# Patient Record
Sex: FEMALE | Race: WHITE | NOT HISPANIC OR LATINO
[De-identification: names, ages, dates, MRNs, and addresses within clinical notes are randomized per-mention and may not be internally consistent; named-entity substitution may affect disease eponyms.]

---

## 2021-05-10 ENCOUNTER — APPOINTMENT (OUTPATIENT)
Dept: BARIATRICS | Facility: CLINIC | Age: 26
End: 2021-05-10
Payer: COMMERCIAL

## 2021-05-10 ENCOUNTER — TRANSCRIPTION ENCOUNTER (OUTPATIENT)
Age: 26
End: 2021-05-10

## 2021-05-10 VITALS — WEIGHT: 240 LBS | BODY MASS INDEX: 39.51 KG/M2 | HEIGHT: 65.5 IN

## 2021-05-10 DIAGNOSIS — Z82.49 FAMILY HISTORY OF ISCHEMIC HEART DISEASE AND OTHER DISEASES OF THE CIRCULATORY SYSTEM: ICD-10-CM

## 2021-05-10 DIAGNOSIS — Z87.42 PERSONAL HISTORY OF OTHER DISEASES OF THE FEMALE GENITAL TRACT: ICD-10-CM

## 2021-05-10 DIAGNOSIS — R51.9 HEADACHE, UNSPECIFIED: ICD-10-CM

## 2021-05-10 DIAGNOSIS — F90.1 ATTENTION-DEFICIT HYPERACTIVITY DISORDER, PREDOMINANTLY HYPERACTIVE TYPE: ICD-10-CM

## 2021-05-10 DIAGNOSIS — Z78.9 OTHER SPECIFIED HEALTH STATUS: ICD-10-CM

## 2021-05-10 PROCEDURE — 99205 OFFICE O/P NEW HI 60 MIN: CPT | Mod: 95

## 2021-05-10 RX ORDER — METFORMIN HYDROCHLORIDE 500 MG/1
500 TABLET, COATED ORAL
Refills: 0 | Status: ACTIVE | COMMUNITY

## 2021-05-10 RX ORDER — DEXTROAMPHETAMINE SULFATE, DEXTROAMPHETAMINE SACCHARATE, AMPHETAMINE SULFATE AND AMPHETAMINE ASPARTATE 1.25; 1.25; 1.25; 1.25 MG/1; MG/1; MG/1; MG/1
5 CAPSULE, EXTENDED RELEASE ORAL
Refills: 0 | Status: ACTIVE | COMMUNITY

## 2021-05-10 NOTE — HISTORY OF PRESENT ILLNESS
[Home] : at home, [unfilled] , at the time of the visit. [Other Location: e.g. Home (Enter Location, City,State)___] : at [unfilled] [Mother] : mother [Verbal consent obtained from patient] : the patient, [unfilled] [Improved Health] : Improved health [Improved Looks/Aesthetics] : Improved looks/aesthetics [Childhood] : childhood [Exercise: ____] : exercise: [unfilled] [Commerial Program: _____] : commercial program: [unfilled] [Medical conditions] : medical conditions [Having a specific meal plan to follow] : having a specific meal plan to follow [FreeTextEntry2] : 210 [FreeTextEntry3] : 240 [______] : [unfilled] [I usually sleep 8 or more hours] : I usually sleep 8 or more hours [Salty] : salty [Sweet] : sweet [0] : How many cups of juice do you drink per day: 0 [8+] : How many cups of water do you regularly drink per day: 8+ [1] : Cups of coffee per day: 1 [Other: ____] : [unfilled] [Parent] : parent [Walking] : walking [3] : 3 [20] : 20 [] : No [Other: ___] : [unfilled] [Metformin] : metformin  [FreeTextEntry1] : 26 y/o female who was referred to medical weight loss program by Dr. Seay\par Hx of PCOS & pre-diabetes \par Follows a healthy dietary lifestyle and feels frustrated that she continues to gain weight and lab values are getting worse- reports increasing A1C and cholesterol levels \par Dietary intake- focuses on lean proteins, eating fruits, vegetables, low carbohydrate diet\par Exercises 3-4 times per week on [employer] & online exercise FIT program\par Takes Metformin 2,000 mg daily, initially saw weight reduction, but has regained weight\par Sleep & water intake adequate \par LMP- 2 weeks ago, currently on birth control

## 2021-05-10 NOTE — ASSESSMENT
[FreeTextEntry1] : Lifestyle modification discussed- educated on decreasing simple sugars and substituting with complex carbohydrates. Pt's current diet is low in carbs, high in protein and vegetables & fruits. Discussed limiting fruit intake and pairing with protein. \par RD referral- interested in specific meal plan, may try optavia \par Exercise goal of 4 days per week, pt currently exercises 3-4 times per week\par Lab- pt to have lab work faxed to our office\par Obesity Medication- pt meets criteria for weight loss medication at this time. She has tried diet & exercise for over 6 months without results. Discussed the option to treat obesity with a GLPI. Patient denies contraindications: denies family hx or personal history of medullary thyroid carcinoma or MEN 2, denies history of pancreatitis/gallbladder disease/hypoglycemia.  Discussed mechanism of action- regulating appetite/ reducing hunger. Reviewed side effects including: N/V/D/C, injection site reactions, headaches, low blood sugar, dizziness, abdominal pain, and fatigue. Reviewed ways to decrease nausea including: eating bland/lowfat foods, eating foods that contain water, and avoiding lying flat after eating. Pt would like to try Rybelsus since it is a pill form.\par RTO in 2 weeks- scheduled a follow up apt for 5/25 at 12pm- pt can only meet at her lunch hour\par Emailed pt information on medication \par \par

## 2021-05-25 ENCOUNTER — APPOINTMENT (OUTPATIENT)
Dept: BARIATRICS | Facility: CLINIC | Age: 26
End: 2021-05-25
Payer: COMMERCIAL

## 2021-05-25 VITALS — BODY MASS INDEX: 38.68 KG/M2 | HEIGHT: 65.5 IN | WEIGHT: 235 LBS

## 2021-05-25 PROCEDURE — 99213 OFFICE O/P EST LOW 20 MIN: CPT | Mod: 95

## 2021-05-25 NOTE — ASSESSMENT
[FreeTextEntry1] : Lifestyle- continue to focus on eating high protein high vegetables diet \par Exercise- continue current exercise regimen\par RD referral per pt request\par Medication- continue Rybelsus 3 mg daily, can increase to 2 pills daily in 2 weeks, then will order 7 mg dose going forward. \par RTO in 3 weeks for frequent check ins- scheduled an apt for 6/21 at 5:30 pm

## 2021-05-25 NOTE — HISTORY OF PRESENT ILLNESS
[Home] : at home, [unfilled] , at the time of the visit. [Other Location: e.g. Home (Enter Location, City,State)___] : at [unfilled] [Verbal consent obtained from patient] : the patient, [unfilled] [FreeTextEntry1] : 24 y/o female who was referred to medical weight loss program by Dr. Seay\par Hx of PCOS & pre-diabetes \par Follows a healthy dietary lifestyle and feels frustrated that she continues to gain weight and lab values are getting worse- reports increasing A1C and cholesterol levels \par Dietary intake- focuses on lean proteins, eating fruits, vegetables, low carbohydrate diet\par Exercises 3-4 times per week on [employer] & online exercise FIT program\par Takes Metformin 2,000 mg daily, initially saw weight reduction, but has regained weight\par Sleep & water intake adequate \par LMP- 2 weeks ago, currently on birth control \par \par 5/25/21: Reports feeling well, lost 5 lbs in 2 weeks. Has been focusing on eating healthy meals and exercising more. Taking Rybelsus 3 mg daily, denies side effects and reports decreased cravings. Used to have late night snacks and doesn't crave them anymore.

## 2021-06-21 ENCOUNTER — APPOINTMENT (OUTPATIENT)
Dept: BARIATRICS | Facility: CLINIC | Age: 26
End: 2021-06-21
Payer: COMMERCIAL

## 2021-06-21 VITALS — HEIGHT: 65.5 IN | WEIGHT: 227 LBS | BODY MASS INDEX: 37.37 KG/M2

## 2021-06-21 PROCEDURE — 99213 OFFICE O/P EST LOW 20 MIN: CPT | Mod: 95

## 2021-06-21 NOTE — HISTORY OF PRESENT ILLNESS
[Home] : at home, [unfilled] , at the time of the visit. [Other Location: e.g. Home (Enter Location, City,State)___] : at [unfilled] [Verbal consent obtained from patient] : the patient, [unfilled] [FreeTextEntry1] : 26 y/o female who was referred to medical weight loss program by Dr. Seay\par Hx of PCOS & pre-diabetes \par Follows a healthy dietary lifestyle and feels frustrated that she continues to gain weight and lab values are getting worse- reports increasing A1C and cholesterol levels \par Dietary intake- focuses on lean proteins, eating fruits, vegetables, low carbohydrate diet\par Exercises 3-4 times per week on [employer] & online exercise FIT program\par Takes Metformin 2,000 mg daily, initially saw weight reduction, but has regained weight\par Sleep & water intake adequate \par LMP- 2 weeks ago, currently on birth control \par \par 5/25/21: Reports feeling well, lost 5 lbs in 2 weeks. Has been focusing on eating healthy meals and exercising more. Taking Rybelsus 3 mg daily, denies side effects and reports decreased cravings. Used to have late night snacks and doesn't crave them anymore. \par \par 6/21/21: Lost 8 lbs,feeling well. Taking 7 mg of Rybelsus daily, occasionally feels nauseous in the morning, denies vomiting. Notices + appetite suppression. Continues to exercise 3-4 times per week. Food recall: B- nuts & yogurt, L-salad with tuna, D- fish with brocolli. Focuses on getting in lean protein and vegetables.

## 2021-06-21 NOTE — ASSESSMENT
[FreeTextEntry1] : Continue current dose of Rybelsus, can decrease dose if nausea persists. \par Continue healthy diet and exercise regimen\par RTO in 1 month- scheduled a follow up appt on 7/19 at 12:00

## 2021-07-19 ENCOUNTER — APPOINTMENT (OUTPATIENT)
Dept: BARIATRICS | Facility: CLINIC | Age: 26
End: 2021-07-19
Payer: COMMERCIAL

## 2021-07-19 VITALS — BODY MASS INDEX: 36.21 KG/M2 | WEIGHT: 220 LBS | HEIGHT: 65.5 IN

## 2021-07-19 PROCEDURE — 99214 OFFICE O/P EST MOD 30 MIN: CPT | Mod: 95

## 2021-07-19 NOTE — ASSESSMENT
[FreeTextEntry1] : Continue dietary modification- will send information on protein shakes (high in protein & low in sugar content)\par Recommend increasing exercise regimen to aerobic exercise for 30-45 mins  3 times per week \par Continue Rybelsus 7 mg daily- has appropriate appetite suppression \par RTO in 1 month- scheduled an apt for 8/23 at 12 pm

## 2021-07-19 NOTE — HISTORY OF PRESENT ILLNESS
[FreeTextEntry1] : 26 y/o female who was referred to medical weight loss program by Dr. Seay\par Hx of PCOS & pre-diabetes \par Follows a healthy dietary lifestyle and feels frustrated that she continues to gain weight and lab values are getting worse- reports increasing A1C and cholesterol levels \par Dietary intake- focuses on lean proteins, eating fruits, vegetables, low carbohydrate diet\par Exercises 3-4 times per week on [employer] & online exercise FIT program\par Takes Metformin 2,000 mg daily, initially saw weight reduction, but has regained weight\par Sleep & water intake adequate \par LMP- 2 weeks ago, currently on birth control \par \par 5/25/21: Reports feeling well, lost 5 lbs in 2 weeks. Has been focusing on eating healthy meals and exercising more. Taking Rybelsus 3 mg daily, denies side effects and reports decreased cravings. Used to have late night snacks and doesn't crave them anymore. \par \par 6/21/21: Lost 8 lbs,feeling well. Taking 7 mg of Rybelsus daily, occasionally feels nauseous in the morning, denies vomiting. Notices + appetite suppression. Continues to exercise 3-4 times per week. Food recall: B- nuts & yogurt, L-salad with tuna, D- fish with brocolli. Focuses on getting in lean protein and vegetables. \par \par 7/19/21: Down to 220 lbs, lost additional 7 lbs. Feels better in clothes and overall. Feels well on Rybelsus 7 mg daily. Reports good appetite suppression & feeling full. Water intake adequate, gets good sleep and denies stress. Has been walking for exercise 2-3 times per week. Food recall- B: 2 scrambled eggs with spinach and onions, L: tuna with crackers, D- all vegetable salad. C/o nausea two days while she had her menstrual cycle, denies nausea otherwise. \par

## 2021-07-19 NOTE — REASON FOR VISIT
[Home] : at home, [unfilled] , at the time of the visit. [Other Location: e.g. Home (Enter Location, City,State)___] : at [unfilled] [Verbal consent obtained from patient] : the patient, [unfilled] [Follow-Up Visit] : a follow-up visit for [Obesity] : obesity [Polycystic Ovary Syndrome] : polycystic ovary syndrome [FreeTextEntry2] : Pre-diabetes

## 2021-08-23 ENCOUNTER — APPOINTMENT (OUTPATIENT)
Dept: BARIATRICS | Facility: CLINIC | Age: 26
End: 2021-08-23
Payer: COMMERCIAL

## 2021-08-23 VITALS — BODY MASS INDEX: 35.56 KG/M2 | WEIGHT: 216 LBS | HEIGHT: 65.5 IN

## 2021-08-23 PROCEDURE — 99214 OFFICE O/P EST MOD 30 MIN: CPT | Mod: 95

## 2021-08-23 RX ORDER — ORAL SEMAGLUTIDE 3 MG/1
3 TABLET ORAL
Qty: 30 | Refills: 1 | Status: DISCONTINUED | COMMUNITY
Start: 2021-05-10 | End: 2021-08-23

## 2021-08-23 NOTE — HISTORY OF PRESENT ILLNESS
[Home] : at home, [unfilled] , at the time of the visit. [Other Location: e.g. Home (Enter Location, City,State)___] : at [unfilled] [Verbal consent obtained from patient] : the patient, [unfilled] [FreeTextEntry1] : 26 y/o female who was referred to medical weight loss program by Dr. Seay\par Hx of PCOS & pre-diabetes \par Follows a healthy dietary lifestyle and feels frustrated that she continues to gain weight and lab values are getting worse- reports increasing A1C and cholesterol levels \par Dietary intake- focuses on lean proteins, eating fruits, vegetables, low carbohydrate diet\par Exercises 3-4 times per week on [employer] & online exercise FIT program\par Takes Metformin 2,000 mg daily, initially saw weight reduction, but has regained weight\par Sleep & water intake adequate \par LMP- 2 weeks ago, currently on birth control \par \par 5/25/21: Reports feeling well, lost 5 lbs in 2 weeks. Has been focusing on eating healthy meals and exercising more. Taking Rybelsus 3 mg daily, denies side effects and reports decreased cravings. Used to have late night snacks and doesn't crave them anymore. \par \par 6/21/21: Lost 8 lbs,feeling well. Taking 7 mg of Rybelsus daily, occasionally feels nauseous in the morning, denies vomiting. Notices + appetite suppression. Continues to exercise 3-4 times per week. Food recall: B- nuts & yogurt, L-salad with tuna, D- fish with brocolli. Focuses on getting in lean protein and vegetables. \par \par 7/19/21: Down to 220 lbs, lost additional 7 lbs. Feels better in clothes and overall. Feels well on Rybelsus 7 mg daily. Reports good appetite suppression & feeling full. Water intake adequate, gets good sleep and denies stress. Has been walking for exercise 2-3 times per week. Food recall- B: 2 scrambled eggs with spinach and onions, L: tuna with crackers, D- all vegetable salad. C/o nausea two days while she had her menstrual cycle, denies nausea otherwise. \par \par 8/23/21:Lost 4 lbs since our last session, down 24 lbs total. Continues to report good appetite suppression, forgets to eat at times and feels full quickly. Makes good food choices. +adequate water intake. Mainly walking for exercise. Denies strength training at present. Denies nausea/ side effects of medication.

## 2021-09-27 ENCOUNTER — APPOINTMENT (OUTPATIENT)
Dept: BARIATRICS | Facility: CLINIC | Age: 26
End: 2021-09-27
Payer: COMMERCIAL

## 2021-09-27 VITALS — WEIGHT: 208 LBS | BODY MASS INDEX: 34.24 KG/M2 | HEIGHT: 65.5 IN

## 2021-09-27 DIAGNOSIS — Z00.00 ENCOUNTER FOR GENERAL ADULT MEDICAL EXAMINATION W/OUT ABNORMAL FINDINGS: ICD-10-CM

## 2021-09-27 DIAGNOSIS — E66.9 OBESITY, UNSPECIFIED: ICD-10-CM

## 2021-09-27 PROCEDURE — 99214 OFFICE O/P EST MOD 30 MIN: CPT | Mod: 95

## 2021-09-27 NOTE — HISTORY OF PRESENT ILLNESS
[Home] : at home, [unfilled] , at the time of the visit. [Other Location: e.g. Home (Enter Location, City,State)___] : at [unfilled] [Verbal consent obtained from patient] : the patient, [unfilled] [FreeTextEntry1] : 26 y/o female who was referred to medical weight loss program by Dr. Seay\par Hx of PCOS & pre-diabetes \par Follows a healthy dietary lifestyle and feels frustrated that she continues to gain weight and lab values are getting worse- reports increasing A1C and cholesterol levels \par Dietary intake- focuses on lean proteins, eating fruits, vegetables, low carbohydrate diet\par Exercises 3-4 times per week on [employer] & online exercise FIT program\par Takes Metformin 2,000 mg daily, initially saw weight reduction, but has regained weight\par Sleep & water intake adequate \par LMP- 2 weeks ago, currently on birth control \par \par 5/25/21: Reports feeling well, lost 5 lbs in 2 weeks. Has been focusing on eating healthy meals and exercising more. Taking Rybelsus 3 mg daily, denies side effects and reports decreased cravings. Used to have late night snacks and doesn't crave them anymore. \par \par 6/21/21: Lost 8 lbs,feeling well. Taking 7 mg of Rybelsus daily, occasionally feels nauseous in the morning, denies vomiting. Notices + appetite suppression. Continues to exercise 3-4 times per week. Food recall: B- nuts & yogurt, L-salad with tuna, D- fish with brocolli. Focuses on getting in lean protein and vegetables. \par \par 7/19/21: Down to 220 lbs, lost additional 7 lbs. Feels better in clothes and overall. Feels well on Rybelsus 7 mg daily. Reports good appetite suppression & feeling full. Water intake adequate, gets good sleep and denies stress. Has been walking for exercise 2-3 times per week. Food recall- B: 2 scrambled eggs with spinach and onions, L: tuna with crackers, D- all vegetable salad. C/o nausea two days while she had her menstrual cycle, denies nausea otherwise. \par \par 8/23/21:Lost 4 lbs since our last session, down 24 lbs total. Continues to report good appetite suppression, forgets to eat at times and feels full quickly. Makes good food choices. +adequate water intake. Mainly walking for exercise. Denies strength training at present. Denies nausea/ side effects of medication. \par \par 9/27/21: Lost 8 lbs, down 32 lbs total. Started tracking dietary intake with myfitnesspal- focuses on eating at leat 1200 kcals daily and feels better overall/ increased energy. Continues to eat well- overnight abbey oats, greek yogurt, vegetables shakes, stopped eating granola bars. Water intake adequate. Walking for exercise. Taking Rybelsus daily- denies side effects.

## 2021-09-27 NOTE — ASSESSMENT
[FreeTextEntry1] : Dietary Modification- continue current dietary changes and continue to track intake with myfitness pal.\par Exercise goal- incorporate strength training exercises \par Medication- sent Rx refill for Rybelsus \par RTO in 1 month- 11/1 at 5:30 pm\par \par

## 2021-11-01 ENCOUNTER — APPOINTMENT (OUTPATIENT)
Dept: BARIATRICS | Facility: CLINIC | Age: 26
End: 2021-11-01
Payer: COMMERCIAL

## 2021-11-01 VITALS — WEIGHT: 204 LBS | BODY MASS INDEX: 33.58 KG/M2 | HEIGHT: 65.5 IN

## 2021-11-01 DIAGNOSIS — R73.03 PREDIABETES.: ICD-10-CM

## 2021-11-01 PROCEDURE — 99214 OFFICE O/P EST MOD 30 MIN: CPT | Mod: 95

## 2021-11-01 NOTE — ASSESSMENT
[FreeTextEntry1] : Dietary- continue current dietary modifications/ meal planning\par Exercise goal of 4 days per week, incorporate strength training \par Labs- f/u labs to be done at endocrinologists this month, will have results faxed to our office \par Obesity Medication-renewal sent for Rybelsus 7 mg daily\par RTO In 1 month- scheduled f/u for 12/6 at 5:30 pm\par \par \par 
Gastroenteritis

## 2021-11-01 NOTE — HISTORY OF PRESENT ILLNESS
[FreeTextEntry1] : 25 y/o female who was referred to medical weight loss program by Dr. Seay\par Hx of PCOS & pre-diabetes \par Follows a healthy dietary lifestyle and feels frustrated that she continues to gain weight and lab values are getting worse- reports increasing A1C and cholesterol levels \par Dietary intake- focuses on lean proteins, eating fruits, vegetables, low carbohydrate diet\par Exercises 3-4 times per week on [employer] & online exercise FIT program\par Takes Metformin 2,000 mg daily, initially saw weight reduction, but has regained weight\par Sleep & water intake adequate \par LMP- 2 weeks ago, currently on birth control \par \par 5/25/21: Reports feeling well, lost 5 lbs in 2 weeks. Has been focusing on eating healthy meals and exercising more. Taking Rybelsus 3 mg daily, denies side effects and reports decreased cravings. Used to have late night snacks and doesn't crave them anymore. \par \par 6/21/21: Lost 8 lbs,feeling well. Taking 7 mg of Rybelsus daily, occasionally feels nauseous in the morning, denies vomiting. Notices + appetite suppression. Continues to exercise 3-4 times per week. Food recall: B- nuts & yogurt, L-salad with tuna, D- fish with brocolli. Focuses on getting in lean protein and vegetables. \par \par 7/19/21: Down to 220 lbs, lost additional 7 lbs. Feels better in clothes and overall. Feels well on Rybelsus 7 mg daily. Reports good appetite suppression & feeling full. Water intake adequate, gets good sleep and denies stress. Has been walking for exercise 2-3 times per week. Food recall- B: 2 scrambled eggs with spinach and onions, L: tuna with crackers, D- all vegetable salad. C/o nausea two days while she had her menstrual cycle, denies nausea otherwise. \par \par 8/23/21:Lost 4 lbs since our last session, down 24 lbs total. Continues to report good appetite suppression, forgets to eat at times and feels full quickly. Makes good food choices. +adequate water intake. Mainly walking for exercise. Denies strength training at present. Denies nausea/ side effects of medication. \par \par 9/27/21: Lost 8 lbs, down 32 lbs total. Started tracking dietary intake with [employer]fitnesspal- focuses on eating at leat 1200 kcals daily and feels better overall/ increased energy. Continues to eat well- overnight abbey oats, greek yogurt, vegetables shakes, stopped eating granola bars. Water intake adequate. Walking for exercise. Taking Rybelsus daily- denies side effects. \par \par 11/1/21: Lost 4 lbs, down 36 lbs total, >15% body weight lost. Feels that clothes are loose and she is down 1-2 sizes. Continues to eat healthy diet. Exercising 3 times for 30 minutes on the Eliptical (HIT class), feels benefits of exercise. Continues to take Rybelsus with good effect- decreased appetite/cravings, denies side effects. Plans to see endocrinologist this month for f/u labs.

## 2021-12-06 ENCOUNTER — APPOINTMENT (OUTPATIENT)
Dept: BARIATRICS/WEIGHT MGMT | Facility: CLINIC | Age: 26
End: 2021-12-06
Payer: COMMERCIAL

## 2021-12-06 VITALS — BODY MASS INDEX: 32.76 KG/M2 | WEIGHT: 199 LBS | HEIGHT: 65.5 IN

## 2021-12-06 DIAGNOSIS — Z87.42 PERSONAL HISTORY OF OTHER DISEASES OF THE FEMALE GENITAL TRACT: ICD-10-CM

## 2021-12-06 PROCEDURE — 99213 OFFICE O/P EST LOW 20 MIN: CPT | Mod: 95

## 2021-12-06 NOTE — HISTORY OF PRESENT ILLNESS
[Home] : at home, [unfilled] , at the time of the visit. [Other Location: e.g. Home (Enter Location, City,State)___] : at [unfilled] [Verbal consent obtained from patient] : the patient, [unfilled] [FreeTextEntry1] : 25 y/o female who was referred to medical weight loss program by Dr. Seay\par Hx of PCOS & pre-diabetes \par Follows a healthy dietary lifestyle and feels frustrated that she continues to gain weight and lab values are getting worse- reports increasing A1C and cholesterol levels \par Dietary intake- focuses on lean proteins, eating fruits, vegetables, low carbohydrate diet\par Exercises 3-4 times per week on [employer] & online exercise FIT program\par Takes Metformin 2,000 mg daily, initially saw weight reduction, but has regained weight\par Sleep & water intake adequate \par LMP- 2 weeks ago, currently on birth control \par \par 5/25/21: Reports feeling well, lost 5 lbs in 2 weeks. Has been focusing on eating healthy meals and exercising more. Taking Rybelsus 3 mg daily, denies side effects and reports decreased cravings. Used to have late night snacks and doesn't crave them anymore. \par \par 6/21/21: Lost 8 lbs,feeling well. Taking 7 mg of Rybelsus daily, occasionally feels nauseous in the morning, denies vomiting. Notices + appetite suppression. Continues to exercise 3-4 times per week. Food recall: B- nuts & yogurt, L-salad with tuna, D- fish with brocolli. Focuses on getting in lean protein and vegetables. \par \par 7/19/21: Down to 220 lbs, lost additional 7 lbs. Feels better in clothes and overall. Feels well on Rybelsus 7 mg daily. Reports good appetite suppression & feeling full. Water intake adequate, gets good sleep and denies stress. Has been walking for exercise 2-3 times per week. Food recall- B: 2 scrambled eggs with spinach and onions, L: tuna with crackers, D- all vegetable salad. C/o nausea two days while she had her menstrual cycle, denies nausea otherwise. \par \par 8/23/21:Lost 4 lbs since our last session, down 24 lbs total. Continues to report good appetite suppression, forgets to eat at times and feels full quickly. Makes good food choices. +adequate water intake. Mainly walking for exercise. Denies strength training at present. Denies nausea/ side effects of medication. \par \par 9/27/21: Lost 8 lbs, down 32 lbs total. Started tracking dietary intake with [employer]pal- focuses on eating at leat 1200 kcals daily and feels better overall/ increased energy. Continues to eat well- overnight abbey oats, greek yogurt, vegetables shakes, stopped eating granola bars. Water intake adequate. Walking for exercise. Taking Rybelsus daily- denies side effects. \par \par 11/1/21: Lost 4 lbs, down 36 lbs total, >15% body weight lost. Feels that clothes are loose and she is down 1-2 sizes. Continues to eat healthy diet. Exercising 3 times for 30 minutes on the Eliptical (HIT class), feels benefits of exercise. Continues to take Rybelsus with good effect- decreased appetite/cravings, denies side effects. Plans to see endocrinologist this month for f/u labs.\par \par 12/6/21: Lost 5 lbs, down 41 lbs total. Continues to eat a healthy diet, 1-2 meals per day. Has occasional cravings during menstrual cycle, but denies appetite/cravings otherwise. Water intake adequate. Increased exercise routine- does exercise classes. Food recall: B- protein pancake, L- cheese and crackers, D- turkey, brussel sprouts, sweet potatoes with pineapples. Continues to take Rybelsus 7 mg daily. Saw endocrinologist and reports that HgA1c was down to 5.4%.

## 2021-12-06 NOTE — ASSESSMENT
[FreeTextEntry1] : Continue dietary modifications\par Exercise 3-4 times per week \par Lab- pt to have recent lab work faxed to our office\par Obesity Medication- continue to take Rybelsus 7 mg daily.\par RTO 2 months- scheduled a f/u on 2/7 at 5:30 pm\par \par \par

## 2022-02-07 ENCOUNTER — APPOINTMENT (OUTPATIENT)
Dept: BARIATRICS/WEIGHT MGMT | Facility: CLINIC | Age: 27
End: 2022-02-07
Payer: COMMERCIAL

## 2022-02-07 VITALS — WEIGHT: 193 LBS | HEIGHT: 65.5 IN | BODY MASS INDEX: 31.77 KG/M2

## 2022-02-07 PROCEDURE — 99214 OFFICE O/P EST MOD 30 MIN: CPT | Mod: 95

## 2022-02-07 RX ORDER — ORAL SEMAGLUTIDE 7 MG/1
7 TABLET ORAL
Qty: 30 | Refills: 1 | Status: ACTIVE | COMMUNITY
Start: 2021-06-01 | End: 1900-01-01

## 2022-02-07 NOTE — ASSESSMENT
[FreeTextEntry1] : Continue dietary modification- given recommendations for protein bars low in sugar, discussed healthy snack options \par Exercise goal to continue cardio + strength training to build muscle and increase metabolic rate \par Medication- continue Rybelsus 7 mg daily \par RTO in 2-3 months: scheduled a f/u apt on 5/2 at 5:30\par \par

## 2022-02-07 NOTE — HISTORY OF PRESENT ILLNESS
[Home] : at home, [unfilled] , at the time of the visit. [Other Location: e.g. Home (Enter Location, City,State)___] : at [unfilled] [Verbal consent obtained from patient] : the patient, [unfilled] [FreeTextEntry1] : 25 y/o female who was referred to medical weight loss program by Dr. Seay\par Hx of PCOS & pre-diabetes \par Follows a healthy dietary lifestyle and feels frustrated that she continues to gain weight and lab values are getting worse- reports increasing A1C and cholesterol levels \par Dietary intake- focuses on lean proteins, eating fruits, vegetables, low carbohydrate diet\par Exercises 3-4 times per week on [employer] & online exercise FIT program\par Takes Metformin 2,000 mg daily, initially saw weight reduction, but has regained weight\par Sleep & water intake adequate \par LMP- 2 weeks ago, currently on birth control \par \par 5/25/21: Reports feeling well, lost 5 lbs in 2 weeks. Has been focusing on eating healthy meals and exercising more. Taking Rybelsus 3 mg daily, denies side effects and reports decreased cravings. Used to have late night snacks and doesn't crave them anymore. \par \par 6/21/21: Lost 8 lbs,feeling well. Taking 7 mg of Rybelsus daily, occasionally feels nauseous in the morning, denies vomiting. Notices + appetite suppression. Continues to exercise 3-4 times per week. Food recall: B- nuts & yogurt, L-salad with tuna, D- fish with brocolli. Focuses on getting in lean protein and vegetables. \par \par 7/19/21: Down to 220 lbs, lost additional 7 lbs. Feels better in clothes and overall. Feels well on Rybelsus 7 mg daily. Reports good appetite suppression & feeling full. Water intake adequate, gets good sleep and denies stress. Has been walking for exercise 2-3 times per week. Food recall- B: 2 scrambled eggs with spinach and onions, L: tuna with crackers, D- all vegetable salad. C/o nausea two days while she had her menstrual cycle, denies nausea otherwise. \par \par 8/23/21:Lost 4 lbs since our last session, down 24 lbs total. Continues to report good appetite suppression, forgets to eat at times and feels full quickly. Makes good food choices. +adequate water intake. Mainly walking for exercise. Denies strength training at present. Denies nausea/ side effects of medication. \par \par 9/27/21: Lost 8 lbs, down 32 lbs total. Started tracking dietary intake with [employer]- focuses on eating at leat 1200 kcals daily and feels better overall/ increased energy. Continues to eat well- overnight abbey oats, greek yogurt, vegetables shakes, stopped eating granola bars. Water intake adequate. Walking for exercise. Taking Rybelsus daily- denies side effects. \par \par 11/1/21: Lost 4 lbs, down 36 lbs total, >15% body weight lost. Feels that clothes are loose and she is down 1-2 sizes. Continues to eat healthy diet. Exercising 3 times for 30 minutes on the [employer] (HIT class), feels benefits of exercise. Continues to take Rybelsus with good effect- decreased appetite/cravings, denies side effects. Plans to see endocrinologist this month for f/u labs.\par \par 12/6/21: Lost 5 lbs, down 41 lbs total. Continues to eat a healthy diet, 1-2 meals per day. Has occasional cravings during menstrual cycle, but denies appetite/cravings otherwise. Water intake adequate. Increased exercise routine- does exercise classes. Food recall: B- protein pancake, L- cheese and crackers, D- turkey, brussel sprouts, sweet potatoes with pineapples. Continues to take Rybelsus 7 mg daily. Saw endocrinologist and reports that HgA1c was down to 5.4%.\par \par 2/7/22: Lost 6 lbs, down 47 lbs total. Continues to focus on eating healthy- lean protein, vegetables, adequate water intake, whole grains. Continues to take Rybelsus 7 mg daily. Does exercise classes and started to use weight. Has occasional cravings for snacks at night, tries to drink water and avoid late night eating.

## 2022-02-17 ENCOUNTER — RESULT REVIEW (OUTPATIENT)
Age: 27
End: 2022-02-17

## 2022-04-07 ENCOUNTER — TRANSCRIPTION ENCOUNTER (OUTPATIENT)
Age: 27
End: 2022-04-07

## 2022-05-02 ENCOUNTER — APPOINTMENT (OUTPATIENT)
Dept: BARIATRICS/WEIGHT MGMT | Facility: CLINIC | Age: 27
End: 2022-05-02
Payer: COMMERCIAL

## 2022-05-02 VITALS — HEIGHT: 65.5 IN | WEIGHT: 193 LBS | BODY MASS INDEX: 31.77 KG/M2

## 2022-05-02 DIAGNOSIS — E66.9 OBESITY, UNSPECIFIED: ICD-10-CM

## 2022-05-02 PROCEDURE — 99213 OFFICE O/P EST LOW 20 MIN: CPT | Mod: 95

## 2022-05-02 NOTE — HISTORY OF PRESENT ILLNESS
[Home] : at home, [unfilled] , at the time of the visit. [Other Location: e.g. Home (Enter Location, City,State)___] : at [unfilled] [FreeTextEntry1] : 27 y/o female who was referred to medical weight loss program by Dr. Seay\par Hx of PCOS & pre-diabetes \par Follows a healthy dietary lifestyle and feels frustrated that she continues to gain weight and lab values are getting worse- reports increasing A1C and cholesterol levels \par Dietary intake- focuses on lean proteins, eating fruits, vegetables, low carbohydrate diet\par Exercises 3-4 times per week on [employer] & online exercise FIT program\par Takes Metformin 2,000 mg daily, initially saw weight reduction, but has regained weight\par Sleep & water intake adequate \par LMP- 2 weeks ago, currently on birth control \par \par 5/25/21: Reports feeling well, lost 5 lbs in 2 weeks. Has been focusing on eating healthy meals and exercising more. Taking Rybelsus 3 mg daily, denies side effects and reports decreased cravings. Used to have late night snacks and doesn't crave them anymore. \par \par 6/21/21: Lost 8 lbs,feeling well. Taking 7 mg of Rybelsus daily, occasionally feels nauseous in the morning, denies vomiting. Notices + appetite suppression. Continues to exercise 3-4 times per week. Food recall: B- nuts & yogurt, L-salad with tuna, D- fish with brocolli. Focuses on getting in lean protein and vegetables. \par \par 7/19/21: Down to 220 lbs, lost additional 7 lbs. Feels better in clothes and overall. Feels well on Rybelsus 7 mg daily. Reports good appetite suppression & feeling full. Water intake adequate, gets good sleep and denies stress. Has been walking for exercise 2-3 times per week. Food recall- B: 2 scrambled eggs with spinach and onions, L: tuna with crackers, D- all vegetable salad. C/o nausea two days while she had her menstrual cycle, denies nausea otherwise. \par \par 8/23/21:Lost 4 lbs since our last session, down 24 lbs total. Continues to report good appetite suppression, forgets to eat at times and feels full quickly. Makes good food choices. +adequate water intake. Mainly walking for exercise. Denies strength training at present. Denies nausea/ side effects of medication. \par \par 9/27/21: Lost 8 lbs, down 32 lbs total. Started tracking dietary intake with [employer]- focuses on eating at leat 1200 kcals daily and feels better overall/ increased energy. Continues to eat well- overnight abbey oats, greek yogurt, vegetables shakes, stopped eating granola bars. Water intake adequate. Walking for exercise. Taking Rybelsus daily- denies side effects. \par \par 11/1/21: Lost 4 lbs, down 36 lbs total, >15% body weight lost. Feels that clothes are loose and she is down 1-2 sizes. Continues to eat healthy diet. Exercising 3 times for 30 minutes on the [employer] (HIT class), feels benefits of exercise. Continues to take Rybelsus with good effect- decreased appetite/cravings, denies side effects. Plans to see endocrinologist this month for f/u labs.\par \par 12/6/21: Lost 5 lbs, down 41 lbs total. Continues to eat a healthy diet, 1-2 meals per day. Has occasional cravings during menstrual cycle, but denies appetite/cravings otherwise. Water intake adequate. Increased exercise routine- does exercise classes. Food recall: B- protein pancake, L- cheese and crackers, D- turkey, brussel sprouts, sweet potatoes with pineapples. Continues to take Rybelsus 7 mg daily. Saw endocrinologist and reports that HgA1c was down to 5.4%.\par \par 2/7/22: Lost 6 lbs, down 47 lbs total. Continues to focus on eating healthy- lean protein, vegetables, adequate water intake, whole grains. Continues to take Rybelsus 7 mg daily. Does exercise classes and started to use weight. Has occasional cravings for snacks at night, tries to drink water and avoid late night eating. \par \par 5/2/22: Maintained wt, down 47 lbs total since starting Rybelsus. Started to experience increased nighttime cravings in April. Increased rybelsus dose to 14 mg daily last week, denies side effects, reports increased satiety. Walking 4 miles outside most days for exercise. + Adequate water intake and sleep. Feels well overall but wants to push herself with exercising more.

## 2022-05-02 NOTE — ASSESSMENT
[FreeTextEntry1] : Continue lifestyle modification- focus dietary intake on lean protein, vegetables and complex carbs. Continue to walk daily, would benefit from incorporating more strength training throughout the week\par Medication- continue to take Rybelsus 14 mg daily \par Plans to see endocrinologist for f/u soon\par RTO in 2 months- scheduled a f/u apt on 7/11 at 5:30 \par \par

## 2022-05-24 ENCOUNTER — RX RENEWAL (OUTPATIENT)
Age: 27
End: 2022-05-24

## 2022-06-20 NOTE — ASSESSMENT
[FreeTextEntry1] : Continue current dietary modification- focus on eating diet high in frutis, vegetables, lean protein and whole grains.\par Exercise goal- recommend strength/ resistance training 3-4 times per week in addition to more intense cardio exercise. \par Medication- continue current dose of Rybelsus. recommend tracking dietary intake and focus on diet of at least 1,000 kcals/1,200 kcals daily.\par RTO in 1 month- scheduled a f/u apt for 9/27 at 5:30 pm\par \par \par 
Possible Home

## 2022-06-30 ENCOUNTER — RX RENEWAL (OUTPATIENT)
Age: 27
End: 2022-06-30

## 2022-07-11 ENCOUNTER — APPOINTMENT (OUTPATIENT)
Dept: BARIATRICS/WEIGHT MGMT | Facility: CLINIC | Age: 27
End: 2022-07-11

## 2022-08-22 ENCOUNTER — RX RENEWAL (OUTPATIENT)
Age: 27
End: 2022-08-22

## 2022-08-22 RX ORDER — ORAL SEMAGLUTIDE 14 MG/1
14 TABLET ORAL
Qty: 30 | Refills: 0 | Status: ACTIVE | COMMUNITY
Start: 2022-04-07 | End: 1900-01-01

## 2024-08-27 ENCOUNTER — NON-APPOINTMENT (OUTPATIENT)
Age: 29
End: 2024-08-27

## 2024-08-27 ENCOUNTER — APPOINTMENT (OUTPATIENT)
Dept: FAMILY MEDICINE | Facility: CLINIC | Age: 29
End: 2024-08-27
Payer: COMMERCIAL

## 2024-08-27 VITALS
DIASTOLIC BLOOD PRESSURE: 76 MMHG | HEIGHT: 65.5 IN | SYSTOLIC BLOOD PRESSURE: 124 MMHG | RESPIRATION RATE: 16 BRPM | OXYGEN SATURATION: 100 % | HEART RATE: 67 BPM | WEIGHT: 239 LBS | BODY MASS INDEX: 39.34 KG/M2

## 2024-08-27 DIAGNOSIS — F90.9 ATTENTION-DEFICIT HYPERACTIVITY DISORDER, UNSPECIFIED TYPE: ICD-10-CM

## 2024-08-27 DIAGNOSIS — Z87.42 PERSONAL HISTORY OF OTHER DISEASES OF THE FEMALE GENITAL TRACT: ICD-10-CM

## 2024-08-27 DIAGNOSIS — R73.03 PREDIABETES.: ICD-10-CM

## 2024-08-27 DIAGNOSIS — E55.9 VITAMIN D DEFICIENCY, UNSPECIFIED: ICD-10-CM

## 2024-08-27 DIAGNOSIS — Z11.3 ENCOUNTER FOR SCREENING FOR INFECTIONS WITH A PREDOMINANTLY SEXUAL MODE OF TRANSMISSION: ICD-10-CM

## 2024-08-27 DIAGNOSIS — Z00.00 ENCOUNTER FOR GENERAL ADULT MEDICAL EXAMINATION W/OUT ABNORMAL FINDINGS: ICD-10-CM

## 2024-08-27 LAB
HCT VFR BLD CALC: 42.1 %
HGB BLD-MCNC: 12.8 G/DL
MCHC RBC-ENTMCNC: 28.3 PG
MCHC RBC-ENTMCNC: 30.4 GM/DL
MCV RBC AUTO: 93.1 FL
PLATELET # BLD AUTO: 328 K/UL
RBC # BLD: 4.52 M/UL
RBC # FLD: 13.8 %
WBC # FLD AUTO: 7.83 K/UL

## 2024-08-27 PROCEDURE — 99205 OFFICE O/P NEW HI 60 MIN: CPT

## 2024-08-27 PROCEDURE — 99385 PREV VISIT NEW AGE 18-39: CPT | Mod: 25

## 2024-08-27 RX ORDER — DEXTROAMPHETAMINE SACCHARATE, AMPHETAMINE ASPARTATE, DEXTROAMPHETAMINE SULFATE AND AMPHETAMINE SULFATE 2.5; 2.5; 2.5; 2.5 MG/1; MG/1; MG/1; MG/1
10 TABLET ORAL
Qty: 30 | Refills: 0 | Status: ACTIVE | COMMUNITY
Start: 1900-01-01 | End: 1900-01-01

## 2024-08-27 RX ORDER — DESOGESTREL AND ETHINYL ESTRADIOL 0.15-0.03
KIT ORAL
Refills: 0 | Status: ACTIVE | COMMUNITY

## 2024-08-27 RX ORDER — DEXTROAMPHETAMINE SACCHARATE, AMPHETAMINE ASPARTATE MONOHYDRATE, DEXTROAMPHETAMINE SULFATE AND AMPHETAMINE SULFATE 2.5; 2.5; 2.5; 2.5 MG/1; MG/1; MG/1; MG/1
10 CAPSULE, EXTENDED RELEASE ORAL DAILY
Qty: 30 | Refills: 0 | Status: ACTIVE | COMMUNITY
Start: 1900-01-01 | End: 1900-01-01

## 2024-08-27 NOTE — HEALTH RISK ASSESSMENT
[Excellent] : ~his/her~  mood as  excellent [Yes] : Yes [2 - 4 times a month (2 pts)] : 2-4 times a month (2 points) [1 or 2 (0 pts)] : 1 or 2 (0 points) [Never (0 pts)] : Never (0 points) [No falls in past year] : Patient reported no falls in the past year [0] : 2) Feeling down, depressed, or hopeless: Not at all (0) [PHQ-2 Negative - No further assessment needed] : PHQ-2 Negative - No further assessment needed [Patient declined Retinal Exam] : Patient declined Retinal Exam. [HIV test declined] : HIV test declined [Hepatitis C test declined] : Hepatitis C test declined [None] : None [Alone] : lives alone [Employed] : employed [Single] : single [# Of Children ___] : has [unfilled] children [Feels Safe at Home] : Feels safe at home [Fully functional (bathing, dressing, toileting, transferring, walking, feeding)] : Fully functional (bathing, dressing, toileting, transferring, walking, feeding) [Fully functional (using the telephone, shopping, preparing meals, housekeeping, doing laundry, using] : Fully functional and needs no help or supervision to perform IADLs (using the telephone, shopping, preparing meals, housekeeping, doing laundry, using transportation, managing medications and managing finances) [Smoke Detector] : smoke detector [Carbon Monoxide Detector] : carbon monoxide detector [Never] : Never [Audit-CScore] : 2 [[id number]] : 0 [Change in mental status noted] : No change in mental status noted [Reports changes in hearing] : Reports no changes in hearing [Reports changes in vision] : Reports no changes in vision [Reports changes in dental health] : Reports no changes in dental health [PapSmearDate] : 2023

## 2024-08-27 NOTE — HISTORY OF PRESENT ILLNESS
[de-identified] : 30 yo F presenting to establish care and for AWV hx of PCOS, ADHD on meds- needs refills patient had used wegovy in past and it was very helpful but has gained 40lbs in 6 months since stopping it despite no dietary changes although she could be exercising more

## 2024-08-28 LAB
25(OH)D3 SERPL-MCNC: 49.2 NG/ML
ALBUMIN SERPL ELPH-MCNC: 4.2 G/DL
ALP BLD-CCNC: 55 U/L
ALT SERPL-CCNC: 9 U/L
ANION GAP SERPL CALC-SCNC: 16 MMOL/L
AST SERPL-CCNC: 14 U/L
BILIRUB SERPL-MCNC: 0.2 MG/DL
BUN SERPL-MCNC: 13 MG/DL
C TRACH RRNA SPEC QL NAA+PROBE: NOT DETECTED
CALCIUM SERPL-MCNC: 9.7 MG/DL
CHLORIDE SERPL-SCNC: 103 MMOL/L
CHOLEST SERPL-MCNC: 251 MG/DL
CO2 SERPL-SCNC: 20 MMOL/L
CREAT SERPL-MCNC: 0.71 MG/DL
EGFR: 119 ML/MIN/1.73M2
ESTIMATED AVERAGE GLUCOSE: 108 MG/DL
GLUCOSE SERPL-MCNC: 93 MG/DL
HBA1C MFR BLD HPLC: 5.4 %
HCV AB SER QL: NONREACTIVE
HCV S/CO RATIO: 0.18 S/CO
HDLC SERPL-MCNC: 77 MG/DL
HIV1+2 AB SPEC QL IA.RAPID: NONREACTIVE
LDLC SERPL CALC-MCNC: 150 MG/DL
N GONORRHOEA RRNA SPEC QL NAA+PROBE: NOT DETECTED
NONHDLC SERPL-MCNC: 174 MG/DL
POTASSIUM SERPL-SCNC: 4.3 MMOL/L
PROT SERPL-MCNC: 7.8 G/DL
SODIUM SERPL-SCNC: 139 MMOL/L
SOURCE AMPLIFICATION: NORMAL
T PALLIDUM AB SER QL IA: NEGATIVE
TRIGL SERPL-MCNC: 134 MG/DL
TSH SERPL-ACNC: 2.47 UIU/ML

## 2024-11-11 ENCOUNTER — APPOINTMENT (OUTPATIENT)
Dept: FAMILY MEDICINE | Facility: CLINIC | Age: 29
End: 2024-11-11
Payer: COMMERCIAL

## 2024-11-11 VITALS
HEART RATE: 75 BPM | WEIGHT: 250 LBS | OXYGEN SATURATION: 96 % | HEIGHT: 65.5 IN | RESPIRATION RATE: 16 BRPM | SYSTOLIC BLOOD PRESSURE: 110 MMHG | BODY MASS INDEX: 41.15 KG/M2 | DIASTOLIC BLOOD PRESSURE: 72 MMHG

## 2024-11-11 DIAGNOSIS — R73.03 PREDIABETES.: ICD-10-CM

## 2024-11-11 DIAGNOSIS — L73.9 FOLLICULAR DISORDER, UNSPECIFIED: ICD-10-CM

## 2024-11-11 DIAGNOSIS — E66.01 MORBID (SEVERE) OBESITY DUE TO EXCESS CALORIES: ICD-10-CM

## 2024-11-11 DIAGNOSIS — E66.811 OBESITY, CLASS 1: ICD-10-CM

## 2024-11-11 DIAGNOSIS — F90.9 ATTENTION-DEFICIT HYPERACTIVITY DISORDER, UNSPECIFIED TYPE: ICD-10-CM

## 2024-11-11 PROCEDURE — G2211 COMPLEX E/M VISIT ADD ON: CPT | Mod: NC

## 2024-11-11 PROCEDURE — 99214 OFFICE O/P EST MOD 30 MIN: CPT

## 2024-11-11 RX ORDER — MUPIROCIN 20 MG/G
2 OINTMENT TOPICAL 3 TIMES DAILY
Qty: 1 | Refills: 1 | Status: ACTIVE | COMMUNITY
Start: 2024-11-11 | End: 1900-01-01

## 2024-11-18 ENCOUNTER — APPOINTMENT (OUTPATIENT)
Dept: BARIATRICS/WEIGHT MGMT | Facility: CLINIC | Age: 29
End: 2024-11-18
Payer: COMMERCIAL

## 2024-11-18 VITALS — BODY MASS INDEX: 41.15 KG/M2 | HEIGHT: 65.5 IN | WEIGHT: 250 LBS

## 2024-11-18 DIAGNOSIS — E78.00 PURE HYPERCHOLESTEROLEMIA, UNSPECIFIED: ICD-10-CM

## 2024-11-18 DIAGNOSIS — Z87.42 PERSONAL HISTORY OF OTHER DISEASES OF THE FEMALE GENITAL TRACT: ICD-10-CM

## 2024-11-18 PROCEDURE — 99214 OFFICE O/P EST MOD 30 MIN: CPT

## 2024-11-18 PROCEDURE — G2211 COMPLEX E/M VISIT ADD ON: CPT | Mod: NC

## 2024-11-18 RX ORDER — TOPIRAMATE 25 MG/1
25 TABLET, FILM COATED ORAL
Qty: 270 | Refills: 0 | Status: ACTIVE | COMMUNITY
Start: 2024-11-18 | End: 1900-01-01

## 2024-11-20 PROBLEM — L73.9 FOLLICULITIS: Status: ACTIVE | Noted: 2024-11-11

## 2024-11-20 PROBLEM — E66.811 OBESITY, CLASS I, BMI 30-34.9: Status: ACTIVE | Noted: 2021-09-27

## 2024-11-20 PROBLEM — E66.01 OBESITY, MORBID (MORE THAN 100 LBS OVER IDEAL WEIGHT OR BMI > 40): Status: ACTIVE | Noted: 2024-11-11

## 2024-11-21 ENCOUNTER — TRANSCRIPTION ENCOUNTER (OUTPATIENT)
Age: 29
End: 2024-11-21

## 2024-11-21 RX ORDER — SEMAGLUTIDE 0.25 MG/.5ML
0.25 INJECTION, SOLUTION SUBCUTANEOUS
Qty: 1 | Refills: 0 | Status: ACTIVE | COMMUNITY
Start: 2024-11-18 | End: 1900-01-01

## 2024-12-16 ENCOUNTER — APPOINTMENT (OUTPATIENT)
Dept: BARIATRICS/WEIGHT MGMT | Facility: CLINIC | Age: 29
End: 2024-12-16
Payer: COMMERCIAL

## 2024-12-16 VITALS — BODY MASS INDEX: 40.99 KG/M2 | WEIGHT: 249 LBS | HEIGHT: 65.5 IN

## 2024-12-16 DIAGNOSIS — E78.00 PURE HYPERCHOLESTEROLEMIA, UNSPECIFIED: ICD-10-CM

## 2024-12-16 DIAGNOSIS — Z87.42 PERSONAL HISTORY OF OTHER DISEASES OF THE FEMALE GENITAL TRACT: ICD-10-CM

## 2024-12-16 DIAGNOSIS — E66.01 MORBID (SEVERE) OBESITY DUE TO EXCESS CALORIES: ICD-10-CM

## 2024-12-16 PROCEDURE — 99213 OFFICE O/P EST LOW 20 MIN: CPT

## 2025-01-27 ENCOUNTER — APPOINTMENT (OUTPATIENT)
Dept: BARIATRICS/WEIGHT MGMT | Facility: CLINIC | Age: 30
End: 2025-01-27